# Patient Record
Sex: MALE | Race: WHITE | Employment: FULL TIME | URBAN - METROPOLITAN AREA
[De-identification: names, ages, dates, MRNs, and addresses within clinical notes are randomized per-mention and may not be internally consistent; named-entity substitution may affect disease eponyms.]

---

## 2019-06-05 ENCOUNTER — HOSPITAL ENCOUNTER (EMERGENCY)
Age: 24
Discharge: HOME | End: 2019-06-05
Payer: MEDICAID

## 2019-06-05 VITALS
OXYGEN SATURATION: 97 % | BODY MASS INDEX: 17.97 KG/M2 | DIASTOLIC BLOOD PRESSURE: 95 MMHG | SYSTOLIC BLOOD PRESSURE: 132 MMHG | HEART RATE: 100 BPM | TEMPERATURE: 98 F | RESPIRATION RATE: 20 BRPM | WEIGHT: 140 LBS | HEIGHT: 74 IN

## 2019-06-05 DIAGNOSIS — K02.9 DENTAL CAVITIES: Primary | ICD-10-CM

## 2019-06-05 DIAGNOSIS — K04.7 DENTAL INFECTION: ICD-10-CM

## 2019-06-05 PROCEDURE — 99283 EMERGENCY DEPT VISIT LOW MDM: CPT

## 2019-06-05 RX ORDER — CLINDAMYCIN HYDROCHLORIDE 300 MG/1
300 CAPSULE ORAL 3 TIMES DAILY
Qty: 30 CAPSULE | Refills: 0 | Status: SHIPPED | OUTPATIENT
Start: 2019-06-05 | End: 2019-06-15

## 2019-06-06 NOTE — ED PROVIDER NOTES
Patient Seen in: [facility] In [place]    History   Patient presents with:  Dental Problem (dental)    Stated Complaint: BROKEN TOOTH - R/O ABSCESS    71-year-old male who presents to the emergency room with complaints of right lower Current:BP (!) 132/95   Pulse 100   Temp 98.1 °F (36.7 °C)   Resp 20   Ht 188 cm (6' 2\")   Wt 63.5 kg   SpO2 97%   BMI 17.97 kg/m²         Physical Exam   Constitutional: He is oriented to person, place, and time.  He appears well-developed and well-nouris I have discussed with the patient and/or family the results of test, differential diagnosis, treatment plan, warning signs and symptoms which should prompt immediate return.   The patient and/or family expressed clear understanding of these instructions and

## 2019-09-30 ENCOUNTER — APPOINTMENT (OUTPATIENT)
Dept: GENERAL RADIOLOGY | Age: 24
End: 2019-09-30
Payer: MEDICAID

## 2019-09-30 ENCOUNTER — HOSPITAL ENCOUNTER (EMERGENCY)
Age: 24
Discharge: HOME | End: 2019-09-30
Attending: EMERGENCY MEDICINE
Payer: MEDICAID

## 2019-09-30 VITALS
SYSTOLIC BLOOD PRESSURE: 122 MMHG | RESPIRATION RATE: 18 BRPM | HEIGHT: 74 IN | WEIGHT: 155 LBS | HEART RATE: 79 BPM | DIASTOLIC BLOOD PRESSURE: 79 MMHG | TEMPERATURE: 98 F | BODY MASS INDEX: 19.89 KG/M2 | OXYGEN SATURATION: 97 %

## 2019-09-30 DIAGNOSIS — R07.89 CHEST PAIN, ATYPICAL: Primary | ICD-10-CM

## 2019-09-30 DIAGNOSIS — J98.01 BRONCHOSPASM: ICD-10-CM

## 2019-09-30 PROCEDURE — 36415 COLL VENOUS BLD VENIPUNCTURE: CPT

## 2019-09-30 PROCEDURE — 93005 ELECTROCARDIOGRAM TRACING: CPT

## 2019-09-30 PROCEDURE — 93010 ELECTROCARDIOGRAM REPORT: CPT

## 2019-09-30 PROCEDURE — 99285 EMERGENCY DEPT VISIT HI MDM: CPT

## 2019-09-30 PROCEDURE — 71046 X-RAY EXAM CHEST 2 VIEWS: CPT | Performed by: EMERGENCY MEDICINE

## 2019-09-30 PROCEDURE — 84484 ASSAY OF TROPONIN QUANT: CPT

## 2019-09-30 PROCEDURE — 99284 EMERGENCY DEPT VISIT MOD MDM: CPT

## 2019-09-30 PROCEDURE — 84484 ASSAY OF TROPONIN QUANT: CPT | Performed by: EMERGENCY MEDICINE

## 2019-09-30 PROCEDURE — 85025 COMPLETE CBC W/AUTO DIFF WBC: CPT

## 2019-09-30 PROCEDURE — 85025 COMPLETE CBC W/AUTO DIFF WBC: CPT | Performed by: EMERGENCY MEDICINE

## 2019-09-30 PROCEDURE — 80053 COMPREHEN METABOLIC PANEL: CPT

## 2019-09-30 PROCEDURE — 80053 COMPREHEN METABOLIC PANEL: CPT | Performed by: EMERGENCY MEDICINE

## 2019-09-30 RX ORDER — ALBUTEROL SULFATE 90 UG/1
2 AEROSOL, METERED RESPIRATORY (INHALATION) EVERY 4 HOURS PRN
Qty: 1 INHALER | Refills: 0 | Status: SHIPPED | OUTPATIENT
Start: 2019-09-30 | End: 2019-10-30

## 2019-09-30 RX ORDER — AMOXICILLIN AND CLAVULANATE POTASSIUM 500; 125 MG/1; MG/1
1 TABLET, FILM COATED ORAL 3 TIMES DAILY
COMMUNITY
End: 2020-02-03

## 2019-09-30 NOTE — ED INITIAL ASSESSMENT (HPI)
C/o constant left sided chest pressure x2 days worse with inspiration and cough  Patient also verbalizes he had hematuria x1 yesterday

## 2019-09-30 NOTE — ED PROVIDER NOTES
3  Patient Seen in: THE [facility] Department In [place]      History   Patient presents with:  Chest Pain Angina (cardiovascular)    Stated Complaint: chest pain for 2 days    HPI    46-year-old male, heavy smoker, chronic cough, is here with left-si are equal, round, and reactive to light. Neck: Normal range of motion. Neck supple. No JVD present. Cardiovascular: Normal rate and regular rhythm. Pulmonary/Chest: Effort normal and breath sounds normal. No stridor. Abdominal: Soft.  There is no patches. Advise following up closely with his PCP. Discussed adverse effects of smoking tobacco.  He understood and plans a follow-up with his PCP for further planning regarding smoking cessation.                         Disposition and Plan     Clinical

## 2020-02-03 ENCOUNTER — HOSPITAL ENCOUNTER (EMERGENCY)
Age: 25
Discharge: HOME | End: 2020-02-03
Attending: EMERGENCY MEDICINE
Payer: MEDICAID

## 2020-02-03 VITALS
SYSTOLIC BLOOD PRESSURE: 141 MMHG | OXYGEN SATURATION: 98 % | RESPIRATION RATE: 16 BRPM | HEART RATE: 88 BPM | BODY MASS INDEX: 19 KG/M2 | DIASTOLIC BLOOD PRESSURE: 86 MMHG | WEIGHT: 150 LBS | TEMPERATURE: 100 F

## 2020-02-03 DIAGNOSIS — K02.9 PAIN DUE TO DENTAL CARIES: Primary | ICD-10-CM

## 2020-02-03 PROCEDURE — 99283 EMERGENCY DEPT VISIT LOW MDM: CPT

## 2020-02-03 RX ORDER — HYDROCODONE BITARTRATE AND ACETAMINOPHEN 5; 325 MG/1; MG/1
1 TABLET ORAL EVERY 4 HOURS PRN
Qty: 10 TABLET | Refills: 0 | Status: SHIPPED | OUTPATIENT
Start: 2020-02-03 | End: 2020-02-05

## 2020-02-03 RX ORDER — AMOXICILLIN 875 MG/1
875 TABLET, COATED ORAL 2 TIMES DAILY
Qty: 20 TABLET | Refills: 0 | Status: SHIPPED | OUTPATIENT
Start: 2020-02-03 | End: 2020-02-13

## 2020-02-04 NOTE — ED PROVIDER NOTES
Patient Seen in: [facility] In [place]      History   Patient presents with:  Dental Problem    Stated Complaint: dental pain (impacted wisdom tooth)    HPI    30-year-old male complaining of dental pain.   The patient's had a impacted have impacted wisdom tooth with possible surrounding infection patient was given amoxicillin given a few Norco advised to follow-up with his dentist to treat.   I did advise him of the fact that there are several dental offices the week of Kiki's Day t

## 2020-12-11 ENCOUNTER — HOSPITAL ENCOUNTER (EMERGENCY)
Age: 25
Discharge: HOME | End: 2020-12-11
Attending: EMERGENCY MEDICINE
Payer: MEDICAID

## 2020-12-11 ENCOUNTER — APPOINTMENT (OUTPATIENT)
Dept: GENERAL RADIOLOGY | Age: 25
End: 2020-12-11
Attending: EMERGENCY MEDICINE
Payer: MEDICAID

## 2020-12-11 VITALS
BODY MASS INDEX: 21.82 KG/M2 | HEIGHT: 74 IN | TEMPERATURE: 99 F | WEIGHT: 170 LBS | HEART RATE: 78 BPM | OXYGEN SATURATION: 98 % | RESPIRATION RATE: 16 BRPM | DIASTOLIC BLOOD PRESSURE: 78 MMHG | SYSTOLIC BLOOD PRESSURE: 146 MMHG

## 2020-12-11 DIAGNOSIS — S83.411A SPRAIN OF MEDIAL COLLATERAL LIGAMENT OF RIGHT KNEE, INITIAL ENCOUNTER: Primary | ICD-10-CM

## 2020-12-11 PROCEDURE — 99283 EMERGENCY DEPT VISIT LOW MDM: CPT

## 2020-12-11 PROCEDURE — 73562 X-RAY EXAM OF KNEE 3: CPT | Performed by: EMERGENCY MEDICINE

## 2020-12-11 NOTE — ED PROVIDER NOTES
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   He twiste TECHNIQUE:  Three views were obtained including patellar view. COMPARISON:  None. INDICATIONS:  PATIENT STATED HISTORY: (As transcribed by Technologist)  The patient states that he has pain and swelling to the right anterior and medial knee.  The patient

## 2020-12-11 NOTE — ED INITIAL ASSESSMENT (HPI)
Pt reports rt knee pain worse since Monday after he slipped and twisted knee. Pt has hx of pain to that knee after previous injury. Pt c/o pain with weight bearing and when he straightens knee.

## 2021-08-12 ENCOUNTER — HOSPITAL ENCOUNTER (EMERGENCY)
Age: 26
Discharge: HOME | End: 2021-08-12
Attending: EMERGENCY MEDICINE
Payer: MEDICAID

## 2021-08-12 VITALS
DIASTOLIC BLOOD PRESSURE: 78 MMHG | OXYGEN SATURATION: 99 % | HEIGHT: 74 IN | BODY MASS INDEX: 20.53 KG/M2 | TEMPERATURE: 98 F | HEART RATE: 98 BPM | SYSTOLIC BLOOD PRESSURE: 133 MMHG | RESPIRATION RATE: 20 BRPM | WEIGHT: 160 LBS

## 2021-08-12 DIAGNOSIS — K08.89 PAIN, DENTAL: Primary | ICD-10-CM

## 2021-08-12 PROCEDURE — 99283 EMERGENCY DEPT VISIT LOW MDM: CPT

## 2021-08-12 RX ORDER — HYDROCODONE BITARTRATE AND ACETAMINOPHEN 5; 325 MG/1; MG/1
1-2 TABLET ORAL EVERY 6 HOURS PRN
Qty: 20 TABLET | Refills: 0 | Status: SHIPPED | OUTPATIENT
Start: 2021-08-12 | End: 2021-08-22

## 2021-08-12 RX ORDER — AMOXICILLIN AND CLAVULANATE POTASSIUM 875; 125 MG/1; MG/1
1 TABLET, FILM COATED ORAL 2 TIMES DAILY
Qty: 20 TABLET | Refills: 0 | Status: SHIPPED | OUTPATIENT
Start: 2021-08-12 | End: 2021-08-22

## 2021-08-12 NOTE — ED PROVIDER NOTES
Patient Seen in: THE [facility] In [place]      History   Patient presents with:  Dental Problem    Stated Complaint: dental pain    HPI/Subjective:   HPI    Patient reports that his left lower wisdom tooth cracked today.   He has extreme nonswollen. Tongue is nonswollen. [place]romucosa is moist.  Lips are moist.    The left lower wisdom tooth is cracked anteriorly. The gum does not appear swollen or erythematous with no obvious abscess amenable to incision and drainage.   The first molar is a

## 2021-09-21 ENCOUNTER — APPOINTMENT (OUTPATIENT)
Dept: GENERAL RADIOLOGY | Age: 26
End: 2021-09-21
Attending: PHYSICIAN ASSISTANT
Payer: MEDICAID

## 2021-09-21 ENCOUNTER — HOSPITAL ENCOUNTER (EMERGENCY)
Age: 26
Discharge: HOME | End: 2021-09-21
Payer: MEDICAID

## 2021-09-21 VITALS
BODY MASS INDEX: 21.17 KG/M2 | OXYGEN SATURATION: 99 % | SYSTOLIC BLOOD PRESSURE: 134 MMHG | HEART RATE: 88 BPM | RESPIRATION RATE: 15 BRPM | HEIGHT: 74 IN | DIASTOLIC BLOOD PRESSURE: 90 MMHG | TEMPERATURE: 99 F | WEIGHT: 165 LBS

## 2021-09-21 DIAGNOSIS — T07.XXXA ABRASIONS OF MULTIPLE SITES: ICD-10-CM

## 2021-09-21 DIAGNOSIS — R58 ECCHYMOSIS: ICD-10-CM

## 2021-09-21 DIAGNOSIS — M70.21 OLECRANON BURSITIS OF RIGHT ELBOW: Primary | ICD-10-CM

## 2021-09-21 PROCEDURE — 99284 EMERGENCY DEPT VISIT MOD MDM: CPT

## 2021-09-21 PROCEDURE — 73110 X-RAY EXAM OF WRIST: CPT | Performed by: PHYSICIAN ASSISTANT

## 2021-09-21 PROCEDURE — 73080 X-RAY EXAM OF ELBOW: CPT | Performed by: PHYSICIAN ASSISTANT

## 2021-09-21 RX ORDER — CEPHALEXIN 500 MG/1
500 CAPSULE ORAL 4 TIMES DAILY
Qty: 28 CAPSULE | Refills: 0 | Status: SHIPPED | OUTPATIENT
Start: 2021-09-21 | End: 2021-09-28

## 2021-09-21 RX ORDER — NAPROXEN 500 MG/1
500 TABLET ORAL 2 TIMES DAILY PRN
Qty: 20 TABLET | Refills: 0 | Status: SHIPPED | OUTPATIENT
Start: 2021-09-21 | End: 2021-09-28

## 2021-09-21 NOTE — ED PROVIDER NOTES
Patient Seen in: THE [facility] In [place]      History   Patient presents with:  Arm or Hand Injury    Stated Complaint: r arm injury patsy night    Subjective:   HPI    81-year-old male with a known history of anxiety and depressions c 99.2 °F (37.3 °C)   Temp src Temporal   SpO2 97 %   O2 Device None (Room air)       Current:BP (!) 157/97   Pulse 95   Temp 99.2 °F (37.3 °C) (Temporal)   Resp 16   Ht 188 cm (6' 2\")   Wt 74.8 kg   SpO2 97%   BMI 21.18 kg/m²         Physical Exam  Vitals Left wrist     bilateral wrists   Right elbow    Right Upper arm:       ED Course   Labs Reviewed - No data to display       XR ELBOW, COMPLETE (MIN 3 VIEWS), RIGHT (CPT=73080)    Result Date: 9/21/2021  PROCEDURE:  XR ELBOW, COMPLETE (MIN 3 VIEWS), RI I have documented all of the patient's bruising and abrasions that he notes are from Sunday.   Patient has full range of motion of the right elbow and right wrist, abrasions noted to both, x-rays personally reviewed by myself which revealed no fracture, A of following up with his doctor- None Pcp  - as instructed. The patient verbalized understanding of the discharge instructions and plan.

## 2021-09-21 NOTE — ED INITIAL ASSESSMENT (HPI)
Was arrested on Sunday night- states while he was in booking- he was was asked to sit down but stated he couldn't and the police thought he was reaching for something and they took him down-- pt c/o r elbow, r wrist, l foot pain and bruising to arms, torso

## 2022-05-07 ENCOUNTER — HOSPITAL ENCOUNTER (EMERGENCY)
Age: 27
Discharge: HOME | End: 2022-05-07
Attending: EMERGENCY MEDICINE
Payer: MEDICAID

## 2022-05-07 VITALS
TEMPERATURE: 98 F | HEART RATE: 82 BPM | OXYGEN SATURATION: 98 % | SYSTOLIC BLOOD PRESSURE: 120 MMHG | RESPIRATION RATE: 17 BRPM | HEIGHT: 74 IN | DIASTOLIC BLOOD PRESSURE: 79 MMHG | WEIGHT: 150 LBS | BODY MASS INDEX: 19.25 KG/M2

## 2022-05-07 DIAGNOSIS — L02.91 ABSCESS: Primary | ICD-10-CM

## 2022-05-07 PROCEDURE — 99283 EMERGENCY DEPT VISIT LOW MDM: CPT

## 2022-05-07 PROCEDURE — 10061 I&D ABSCESS COMP/MULTIPLE: CPT

## 2022-05-07 RX ORDER — SULFAMETHOXAZOLE AND TRIMETHOPRIM 800; 160 MG/1; MG/1
1 TABLET ORAL 2 TIMES DAILY
Qty: 14 TABLET | Refills: 0 | Status: SHIPPED | OUTPATIENT
Start: 2022-05-07 | End: 2022-05-14

## 2022-05-07 RX ORDER — IBUPROFEN 600 MG/1
600 TABLET ORAL ONCE
Status: COMPLETED | OUTPATIENT
Start: 2022-05-07 | End: 2022-05-07

## 2022-06-30 ENCOUNTER — APPOINTMENT (OUTPATIENT)
Dept: GENERAL RADIOLOGY | Age: 27
End: 2022-06-30
Attending: EMERGENCY MEDICINE
Payer: MEDICAID

## 2022-06-30 ENCOUNTER — HOSPITAL ENCOUNTER (EMERGENCY)
Age: 27
Discharge: HOME | End: 2022-06-30
Attending: EMERGENCY MEDICINE
Payer: MEDICAID

## 2022-06-30 VITALS
SYSTOLIC BLOOD PRESSURE: 127 MMHG | HEART RATE: 84 BPM | BODY MASS INDEX: 20.15 KG/M2 | RESPIRATION RATE: 16 BRPM | HEIGHT: 74 IN | TEMPERATURE: 98 F | OXYGEN SATURATION: 100 % | DIASTOLIC BLOOD PRESSURE: 87 MMHG | WEIGHT: 157 LBS

## 2022-06-30 DIAGNOSIS — S63.501A SPRAIN OF RIGHT WRIST, INITIAL ENCOUNTER: ICD-10-CM

## 2022-06-30 DIAGNOSIS — S20.211A RIB CONTUSION, RIGHT, INITIAL ENCOUNTER: Primary | ICD-10-CM

## 2022-06-30 PROCEDURE — 71101 X-RAY EXAM UNILAT RIBS/CHEST: CPT | Performed by: EMERGENCY MEDICINE

## 2022-06-30 PROCEDURE — 99284 EMERGENCY DEPT VISIT MOD MDM: CPT

## 2022-06-30 PROCEDURE — 73110 X-RAY EXAM OF WRIST: CPT | Performed by: EMERGENCY MEDICINE

## 2022-06-30 RX ORDER — NAPROXEN 500 MG/1
500 TABLET ORAL 2 TIMES DAILY PRN
Qty: 20 TABLET | Refills: 0 | Status: SHIPPED | OUTPATIENT
Start: 2022-06-30 | End: 2022-07-07
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
## 2024-02-14 ENCOUNTER — HOSPITAL ENCOUNTER (EMERGENCY)
Age: 29
Discharge: HOME | End: 2024-02-14
Payer: MEDICAID

## 2024-02-14 VITALS
DIASTOLIC BLOOD PRESSURE: 48 MMHG | RESPIRATION RATE: 18 BRPM | OXYGEN SATURATION: 97 % | HEIGHT: 74 IN | TEMPERATURE: 99 F | BODY MASS INDEX: 20.53 KG/M2 | WEIGHT: 160 LBS | SYSTOLIC BLOOD PRESSURE: 118 MMHG | HEART RATE: 81 BPM

## 2024-02-14 DIAGNOSIS — J10.1 INFLUENZA A: ICD-10-CM

## 2024-02-14 DIAGNOSIS — S39.012A STRAIN OF LUMBAR REGION, INITIAL ENCOUNTER: Primary | ICD-10-CM

## 2024-02-14 LAB
POCT INFLUENZA A: POSITIVE
POCT INFLUENZA B: NEGATIVE
SARS-COV-2 RNA RESP QL NAA+PROBE: NOT DETECTED

## 2024-02-14 PROCEDURE — 99283 EMERGENCY DEPT VISIT LOW MDM: CPT

## 2024-02-14 PROCEDURE — 87502 INFLUENZA DNA AMP PROBE: CPT

## 2024-02-14 PROCEDURE — 99284 EMERGENCY DEPT VISIT MOD MDM: CPT

## 2024-02-14 PROCEDURE — 96372 THER/PROPH/DIAG INJ SC/IM: CPT

## 2024-02-14 RX ORDER — KETOROLAC TROMETHAMINE 30 MG/ML
30 INJECTION, SOLUTION INTRAMUSCULAR; INTRAVENOUS ONCE
Status: COMPLETED | OUTPATIENT
Start: 2024-02-14 | End: 2024-02-14

## 2024-02-14 RX ORDER — LIDOCAINE 50 MG/G
1 PATCH TOPICAL EVERY 24 HOURS
Qty: 5 PATCH | Refills: 0 | Status: SHIPPED | OUTPATIENT
Start: 2024-02-14 | End: 2024-02-19

## 2024-02-14 RX ORDER — CYCLOBENZAPRINE HCL 10 MG
10 TABLET ORAL 3 TIMES DAILY PRN
Qty: 20 TABLET | Refills: 0 | Status: SHIPPED | OUTPATIENT
Start: 2024-02-14 | End: 2024-02-21

## 2024-02-14 RX ORDER — DIAZEPAM 5 MG/1
5 TABLET ORAL ONCE
Status: COMPLETED | OUTPATIENT
Start: 2024-02-14 | End: 2024-02-14

## 2024-02-14 RX ORDER — ACETAMINOPHEN 500 MG
1000 TABLET ORAL ONCE
Status: COMPLETED | OUTPATIENT
Start: 2024-02-14 | End: 2024-02-14

## 2024-02-14 RX ORDER — OSELTAMIVIR PHOSPHATE 75 MG/1
75 CAPSULE ORAL 2 TIMES DAILY
Qty: 10 CAPSULE | Refills: 0 | Status: SHIPPED | OUTPATIENT
Start: 2024-02-14 | End: 2024-02-19

## 2024-02-14 RX ORDER — IBUPROFEN 600 MG/1
600 TABLET ORAL EVERY 8 HOURS PRN
Qty: 21 TABLET | Refills: 0 | Status: SHIPPED | OUTPATIENT
Start: 2024-02-14 | End: 2024-02-21

## 2024-02-15 NOTE — ED PROVIDER NOTES
Patient Seen in: [facility] In [place]      History     Chief Complaint   Patient presents with    Back Pain     Stated Complaint: lower back pain since this morning    Subjective:   HPI  Frederick Villalpando is a 28 year old male here for evluation of low back pain with radiation down posterior right thigh that started this afternoon .   Atraumatic in nature however patient reports that back pain started while bending over working.  No reported numbness, tingling, parasthesias, skin, color, temperature, sensory changes, laura/ bladder dysfunction, loss of bowel/ bladder control, saddle anesthesia.  No medications taken prior to arrival.   Pain 5/10 worsened with flexion, extension, weight bearing.  Of note patient reports acute onset of generalized myalgias that started over 48 hours prior to arrival with decreased energy subjective fevers. Otherwise patient denies history of cancer, unexplained weight loss, IV drug abuse, systemic complaints.        Objective:   Past Medical History:   Diagnosis Date    Anxiety     Bipolar affective (HCC)     Bowel obstruction (HCC)     Depression               Past Surgical History:   Procedure Laterality Date    COLON SURGERY      REPAIR ING HERNIA,5+Y/O,REDUCIBL                  Social History     Socioeconomic History    Marital status: Single   Tobacco Use    Smoking status: Every Day     Packs/day: 1     Types: Cigarettes    Smokeless tobacco: Never   Vaping Use    Vaping Use: Never used   Substance and Sexual Activity    Alcohol use: Yes    Drug use: Yes     Types: Cannabis              Review of Systems   All other systems reviewed and are negative.      Positive for stated complaint: lower back pain since this morning  Other systems are as noted in HPI.  Constitutional and vital signs reviewed.      All other systems reviewed and negative except as noted above.    Physical Exam     ED Triage Vitals   BP 02/14/24 1849 140/71   Pulse 02/14/24 1849 91   Resp  02/14/24 1849 18   Temp 02/14/24 1849 100.1 °F (37.8 °C)   Temp src 02/14/24 1849 Oral   SpO2 02/14/24 1849 98 %   O2 Device 02/14/24 2124 None (Room air)       Current:/48   Pulse 81   Temp (!) 101.6 °F (38.7 °C) (Oral)   Resp 18   Ht 188 cm (6' 2\")   Wt 72.6 kg   SpO2 97%   BMI 20.54 kg/m²         Physical Exam  Vitals and nursing note reviewed.   Constitutional:       General: He is not in acute distress.     Appearance: Normal appearance. He is normal weight. He is not ill-appearing, toxic-appearing or diaphoretic.   HENT:      Head: Normocephalic and atraumatic.      Right Ear: Tympanic membrane and ear canal normal.      Left Ear: Tympanic membrane and ear canal normal.      Nose: Congestion present. No rhinorrhea.      Mouth/Throat:      Mouth: Mucous membranes are moist.      Pharynx: Oropharynx is clear. No oropharyngeal exudate or posterior oropharyngeal erythema.   Eyes:      Extraocular Movements: Extraocular movements intact.      Conjunctiva/sclera: Conjunctivae normal.      Pupils: Pupils are equal, round, and reactive to light.   Cardiovascular:      Rate and Rhythm: Normal rate.      Pulses: Normal pulses.   Pulmonary:      Effort: Pulmonary effort is normal. No respiratory distress.      Breath sounds: Normal breath sounds. No stridor. No wheezing, rhonchi or rales.   Chest:      Chest wall: No tenderness.   Musculoskeletal:         General: Tenderness present. No swelling, deformity or signs of injury. Normal range of motion.      Cervical back: Normal range of motion and neck supple.      Right lower leg: No edema.      Left lower leg: No edema.      Comments: Bilateral lumbosacral paraspinal tenderness to palpation.  No step offs, deformities, crepitus, midline tenderness.  Full back flexion/ extension      Skin:     General: Skin is warm and dry.      Capillary Refill: Capillary refill takes less than 2 seconds.      Coloration: Skin is not jaundiced or pale.      Findings: No  bruising, erythema, lesion or rash.   Neurological:      General: No focal deficit present.      Mental Status: He is alert and oriented to person, place, and time. Mental status is at baseline.   Psychiatric:         Mood and Affect: Mood normal.         Behavior: Behavior normal.         Thought Content: Thought content normal.         Judgment: Judgment normal.               ED Course     Labs Reviewed   POCT FLU TEST - Abnormal; Notable for the following components:       Result Value    POCT INFLUENZA A Positive (*)     All other components within normal limits    Narrative:     This assay is a rapid molecular in vitro test utilizing nucleic acid amplification of influenza A and B viral RNA.   RAPID SARS-COV-2 BY PCR - Normal                      MDM                                         Medical Decision Making  28-year-old male presents with acute onset of low back pain due to lumbar strain sustained while bending over at work earlier today.  Additionally patient reports acute onset of generalized myalgias, subjective fevers, decreased energy that started less than 48 hours prior to arrival likely due to influenza A.  Patient denies history of IV drug abuse.  Considerations to include cauda equina versus piriformis syndrome.    Plan   - toradol 30mg IM/ valium 5mg po/ tylenol 1g po now   -  COVID 19/ flu A and B swabs  - Alternate between ice and heat to affected region   - Rx: tamiflu 75mg po BID x 5 days.  ibuprofen 600mg po together q 8 hours/ prn. flexeril 10mg po TID. Lidoderm adhesive patches to affected region daily/ prn   -Apply for no more than 12 consecutive hours   -Do not apply ice and/ or heat on top of Lidoderm patch   -Do not apply to broken skin      - OTC: Tylenol 1g po q 6 hours/ prn.    - refer to orthopaedics/ PCP as needed  - explained to patient that if symptoms do not improve that an MRI may be warranted however that will be determined at the discretion of follow up care  - RICE:   Alternate between ice and heat 4-5 times daily or as needed for no longer than 5-10 minutes at a time.   When applying ice apply a layer of cloth between skin and cold source in order to prevent tissue irritation  - Return to ED if symptoms worsen            Amount and/or Complexity of Data Reviewed  Labs: ordered. Decision-making details documented in ED Course.     Details: COVID 19/   flu B swabs- negative   flu A- positive          Disposition and Plan     Clinical Impression:  1. Strain of lumbar region, initial encounter    2. Influenza A         Disposition:  There is no disposition on file for this visit.  There is no disposition time on file for this visit.    Follow-up:  [facility], [address] [facility], [place] [address] [facility] [address]  [facility] [address] [phone]  Follow up      [facility] in [place] [address] [address] [facility] [address]  [phone]  Follow up            Medications Prescribed:  Current Discharge Medication List        START taking these medications    Details   oseltamivir (TAMIFLU) 75 MG Oral Cap Take 1 capsule (75 mg total) by mouth 2 (two) times daily for 5 days.  Qty: 10 capsule, Refills: 0      ibuprofen 600 MG Oral Tab Take 1 tablet (600 mg total) by mouth every 8 (eight) hours as needed for Pain or Fever.  Qty: 21 tablet, Refills: 0      cyclobenzaprine 10 MG Oral Tab Take 1 tablet (10 mg total) by mouth 3 (three) times daily as needed for Muscle spasms.  Qty: 20 tablet, Refills: 0      lidocaine 5 % External Patch Place 1 patch onto the skin daily for 5 days.  Qty: 5 patch, Refills: 0

## 2025-03-09 ENCOUNTER — APPOINTMENT (OUTPATIENT)
Dept: GENERAL RADIOLOGY | Age: 30
End: 2025-03-09
Payer: MEDICAID

## 2025-03-09 ENCOUNTER — HOSPITAL ENCOUNTER (EMERGENCY)
Age: 30
Discharge: HOME | End: 2025-03-09
Payer: MEDICAID

## 2025-03-09 VITALS
BODY MASS INDEX: 20.53 KG/M2 | OXYGEN SATURATION: 98 % | HEART RATE: 90 BPM | HEIGHT: 74 IN | RESPIRATION RATE: 16 BRPM | DIASTOLIC BLOOD PRESSURE: 87 MMHG | SYSTOLIC BLOOD PRESSURE: 135 MMHG | WEIGHT: 160 LBS | TEMPERATURE: 98 F

## 2025-03-09 DIAGNOSIS — S60.222A CONTUSION OF LEFT HAND, INITIAL ENCOUNTER: Primary | ICD-10-CM

## 2025-03-09 PROCEDURE — 73130 X-RAY EXAM OF HAND: CPT

## 2025-03-09 PROCEDURE — 99284 EMERGENCY DEPT VISIT MOD MDM: CPT

## 2025-03-09 PROCEDURE — 73110 X-RAY EXAM OF WRIST: CPT

## 2025-03-09 RX ORDER — IBUPROFEN 600 MG/1
600 TABLET, FILM COATED ORAL ONCE
Status: COMPLETED | OUTPATIENT
Start: 2025-03-09 | End: 2025-03-09

## 2025-03-09 NOTE — DISCHARGE INSTRUCTIONS
The Ace wrap as needed for comfort.  Ice and elevate the affected area.  Tylenol or ibuprofen as needed for pain.  Activity as tolerated.    Follow-up with Ortho.  See your discharge paperwork for referral information.  If there are any new, changing or worsening symptoms return for reevaluation or go to the ER.  
Detailed exam
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
## 2025-05-08 ENCOUNTER — HOSPITAL ENCOUNTER (OUTPATIENT)
Dept: CT IMAGING | Facility: HOSPITAL | Age: 30
Discharge: HOME | End: 2025-05-08
Attending: NURSE PRACTITIONER
Payer: MEDICAID

## 2025-05-08 DIAGNOSIS — R10.31 RIGHT LOWER QUADRANT PAIN: ICD-10-CM

## 2025-05-08 PROCEDURE — 74176 CT ABD & PELVIS W/O CONTRAST: CPT | Performed by: NURSE PRACTITIONER

## (undated) NOTE — ED AVS SNAPSHOT
Dixie Navarro   MRN: [id number]    Department:  [facility] in [place]   Date of Visit:  9/30/2019           Disclosure     Insurance plans vary and the physician(s) referred by the ER may not be covered by your plan.  Please contact tell this physician (or your personal doctor if your instructions are to return to your personal doctor) about any new or lasting problems. The primary care or specialist physician will see patients referred from the [facility] Emergency Department.  Jose Negron

## (undated) NOTE — ED AVS SNAPSHOT
Angelita López   MRN: [id number]    Department:  [facility] in [place]   Date of Visit:  6/5/2019           Disclosure     Insurance plans vary and the physician(s) referred by the ER may not be covered by your plan.  Please contact y tell this physician (or your personal doctor if your instructions are to return to your personal doctor) about any new or lasting problems. The primary care or specialist physician will see patients referred from the [facility] Emergency Department.  Salvadore Skiff

## (undated) NOTE — LETTER
Date & Time: 9/30/2019, 10:37 AM  Patient: Abram Ree  Encounter Provider(s):    Edmund Maza MD       To Whom It May Concern:    Abram Alfred was seen and treated in our department on 9/30/2019. He should not return to work until 10/1/19.     If you

## (undated) NOTE — LETTER
Date & Time: 12/11/2020, 8:06 AM  Patient: Anupama Hernández      To Whom It May Concern:    Anupama Hernández was seen and treated in our department on 12/11/2020. He can return to work with these limitations: No standing/walking for 1 week.     If you have any

## (undated) NOTE — LETTER
Date & Time: 2/14/2024, 9:51 PM  Patient: Frederick Villalpando  Encounter Provider(s):    Lazaro Kate PA-C       To Whom It May Concern:    Frederick Villalpando was seen and treated in our department on 2/14/2024. He should not return to work until 2/17/2024 .    If you have any questions or concerns, please do not hesitate to call.        _____________________________  Physician/APC Signature

## (undated) NOTE — ED AVS SNAPSHOT
Rosalio Radha   MRN: [id number]    Department:  [facility] in [place]   Date of Visit:  2/3/2020           Disclosure     Insurance plans vary and the physician(s) referred by the ER may not be covered by your plan.  Please contact y tell this physician (or your personal doctor if your instructions are to return to your personal doctor) about any new or lasting problems. The primary care or specialist physician will see patients referred from the [facility] Emergency Department.  Jana Rodirguez

## (undated) NOTE — LETTER
Date & Time: 9/21/2021, 3:11 PM  Patient: Blaine Callahan  Encounter Provider(s):    Bolivar Alvarez PA-C       To Whom It May Concern:    Blaine Callahan was seen and treated in our department on 9/21/2021.  He should not return to work until 2 DAYS and can